# Patient Record
Sex: FEMALE | Race: ASIAN | NOT HISPANIC OR LATINO
[De-identification: names, ages, dates, MRNs, and addresses within clinical notes are randomized per-mention and may not be internally consistent; named-entity substitution may affect disease eponyms.]

---

## 2017-04-05 ENCOUNTER — MEDICATION RENEWAL (OUTPATIENT)
Age: 72
End: 2017-04-05

## 2017-11-02 ENCOUNTER — MEDICATION RENEWAL (OUTPATIENT)
Age: 72
End: 2017-11-02

## 2017-12-16 ENCOUNTER — NON-APPOINTMENT (OUTPATIENT)
Age: 72
End: 2017-12-16

## 2017-12-16 ENCOUNTER — APPOINTMENT (OUTPATIENT)
Dept: CARDIOLOGY | Facility: CLINIC | Age: 72
End: 2017-12-16
Payer: MEDICAID

## 2017-12-16 VITALS
DIASTOLIC BLOOD PRESSURE: 87 MMHG | RESPIRATION RATE: 17 BRPM | SYSTOLIC BLOOD PRESSURE: 145 MMHG | WEIGHT: 89 LBS | BODY MASS INDEX: 18.94 KG/M2 | OXYGEN SATURATION: 98 % | TEMPERATURE: 98.3 F | HEART RATE: 52 BPM

## 2017-12-16 DIAGNOSIS — Z23 ENCOUNTER FOR IMMUNIZATION: ICD-10-CM

## 2017-12-16 PROCEDURE — 90670 PCV13 VACCINE IM: CPT

## 2017-12-16 PROCEDURE — G0009: CPT

## 2017-12-16 PROCEDURE — G0008: CPT

## 2017-12-16 PROCEDURE — 99214 OFFICE O/P EST MOD 30 MIN: CPT | Mod: 25

## 2017-12-16 PROCEDURE — 90688 IIV4 VACCINE SPLT 0.5 ML IM: CPT

## 2017-12-16 PROCEDURE — 93000 ELECTROCARDIOGRAM COMPLETE: CPT

## 2017-12-18 LAB
25(OH)D3 SERPL-MCNC: 25.8 NG/ML
ALBUMIN SERPL ELPH-MCNC: 4.3 G/DL
ALP BLD-CCNC: 58 U/L
ALT SERPL-CCNC: 12 U/L
ANION GAP SERPL CALC-SCNC: 16 MMOL/L
AST SERPL-CCNC: 21 U/L
BASOPHILS # BLD AUTO: 0.03 K/UL
BASOPHILS NFR BLD AUTO: 0.5 %
BILIRUB SERPL-MCNC: 1.2 MG/DL
BUN SERPL-MCNC: 13 MG/DL
CALCIUM SERPL-MCNC: 9.5 MG/DL
CHLORIDE SERPL-SCNC: 104 MMOL/L
CHOLEST SERPL-MCNC: 138 MG/DL
CHOLEST/HDLC SERPL: 2.2 RATIO
CO2 SERPL-SCNC: 20 MMOL/L
CREAT SERPL-MCNC: 0.57 MG/DL
EOSINOPHIL # BLD AUTO: 0.25 K/UL
EOSINOPHIL NFR BLD AUTO: 4.3 %
GLUCOSE SERPL-MCNC: 89 MG/DL
HBA1C MFR BLD HPLC: 5.4 %
HCT VFR BLD CALC: 39.2 %
HDLC SERPL-MCNC: 63 MG/DL
HGB BLD-MCNC: 12.8 G/DL
IMM GRANULOCYTES NFR BLD AUTO: 0.2 %
LDLC SERPL CALC-MCNC: 62 MG/DL
LYMPHOCYTES # BLD AUTO: 1.23 K/UL
LYMPHOCYTES NFR BLD AUTO: 21.2 %
MAN DIFF?: NORMAL
MCHC RBC-ENTMCNC: 31.8 PG
MCHC RBC-ENTMCNC: 32.7 GM/DL
MCV RBC AUTO: 97.3 FL
MONOCYTES # BLD AUTO: 0.54 K/UL
MONOCYTES NFR BLD AUTO: 9.3 %
NEUTROPHILS # BLD AUTO: 3.73 K/UL
NEUTROPHILS NFR BLD AUTO: 64.5 %
PLATELET # BLD AUTO: 186 K/UL
POTASSIUM SERPL-SCNC: 4.4 MMOL/L
PROT SERPL-MCNC: 7.6 G/DL
RBC # BLD: 4.03 M/UL
RBC # FLD: 13.1 %
SODIUM SERPL-SCNC: 140 MMOL/L
TRIGL SERPL-MCNC: 65 MG/DL
TSH SERPL-ACNC: 0.86 UIU/ML
WBC # FLD AUTO: 5.79 K/UL

## 2018-01-03 ENCOUNTER — RESULT REVIEW (OUTPATIENT)
Age: 73
End: 2018-01-03

## 2018-01-08 ENCOUNTER — MEDICATION RENEWAL (OUTPATIENT)
Age: 73
End: 2018-01-08

## 2018-01-19 ENCOUNTER — MEDICATION RENEWAL (OUTPATIENT)
Age: 73
End: 2018-01-19

## 2018-04-16 ENCOUNTER — RX RENEWAL (OUTPATIENT)
Age: 73
End: 2018-04-16

## 2018-04-24 ENCOUNTER — RX RENEWAL (OUTPATIENT)
Age: 73
End: 2018-04-24

## 2018-07-24 ENCOUNTER — MEDICATION RENEWAL (OUTPATIENT)
Age: 73
End: 2018-07-24

## 2018-08-16 ENCOUNTER — RX RENEWAL (OUTPATIENT)
Age: 73
End: 2018-08-16

## 2018-09-08 ENCOUNTER — APPOINTMENT (OUTPATIENT)
Dept: CARDIOLOGY | Facility: CLINIC | Age: 73
End: 2018-09-08
Payer: MEDICAID

## 2018-09-08 VITALS
WEIGHT: 85 LBS | RESPIRATION RATE: 17 BRPM | OXYGEN SATURATION: 97 % | HEART RATE: 65 BPM | TEMPERATURE: 98.3 F | DIASTOLIC BLOOD PRESSURE: 72 MMHG | SYSTOLIC BLOOD PRESSURE: 146 MMHG | BODY MASS INDEX: 18.09 KG/M2

## 2018-09-08 PROCEDURE — 99214 OFFICE O/P EST MOD 30 MIN: CPT

## 2018-09-09 LAB
25(OH)D3 SERPL-MCNC: 40.8 NG/ML
ALBUMIN SERPL ELPH-MCNC: 4.9 G/DL
ALP BLD-CCNC: 48 U/L
ALT SERPL-CCNC: 8 U/L
ANION GAP SERPL CALC-SCNC: 16 MMOL/L
AST SERPL-CCNC: 24 U/L
BASOPHILS # BLD AUTO: 0.03 K/UL
BASOPHILS NFR BLD AUTO: 0.5 %
BILIRUB SERPL-MCNC: 1 MG/DL
BUN SERPL-MCNC: 11 MG/DL
CALCIUM SERPL-MCNC: 9.3 MG/DL
CHLORIDE SERPL-SCNC: 102 MMOL/L
CHOLEST SERPL-MCNC: 133 MG/DL
CHOLEST/HDLC SERPL: 2 RATIO
CO2 SERPL-SCNC: 21 MMOL/L
CREAT SERPL-MCNC: 0.6 MG/DL
EOSINOPHIL # BLD AUTO: 0.1 K/UL
EOSINOPHIL NFR BLD AUTO: 1.8 %
GLUCOSE SERPL-MCNC: 88 MG/DL
HBA1C MFR BLD HPLC: 5.4 %
HCT VFR BLD CALC: 39.8 %
HDLC SERPL-MCNC: 65 MG/DL
HGB BLD-MCNC: 12.7 G/DL
IMM GRANULOCYTES NFR BLD AUTO: 0.2 %
LDLC SERPL CALC-MCNC: 58 MG/DL
LYMPHOCYTES # BLD AUTO: 1.29 K/UL
LYMPHOCYTES NFR BLD AUTO: 22.8 %
MAN DIFF?: NORMAL
MCHC RBC-ENTMCNC: 31.3 PG
MCHC RBC-ENTMCNC: 31.9 GM/DL
MCV RBC AUTO: 98 FL
MONOCYTES # BLD AUTO: 0.43 K/UL
MONOCYTES NFR BLD AUTO: 7.6 %
NEUTROPHILS # BLD AUTO: 3.8 K/UL
NEUTROPHILS NFR BLD AUTO: 67.1 %
PLATELET # BLD AUTO: 183 K/UL
POTASSIUM SERPL-SCNC: 4.4 MMOL/L
PROT SERPL-MCNC: 7.6 G/DL
RBC # BLD: 4.06 M/UL
RBC # FLD: 13.1 %
SODIUM SERPL-SCNC: 139 MMOL/L
TRIGL SERPL-MCNC: 49 MG/DL
TSH SERPL-ACNC: 0.76 UIU/ML
WBC # FLD AUTO: 5.66 K/UL

## 2018-11-05 ENCOUNTER — RX RENEWAL (OUTPATIENT)
Age: 73
End: 2018-11-05

## 2018-11-05 LAB
H PYLORI AB SER-ACNC: 9.5 UNITS
H PYLORI IGA SER-ACNC: 29.9 UNITS

## 2018-12-20 ENCOUNTER — APPOINTMENT (OUTPATIENT)
Dept: GASTROENTEROLOGY | Facility: CLINIC | Age: 73
End: 2018-12-20

## 2018-12-26 ENCOUNTER — RX RENEWAL (OUTPATIENT)
Age: 73
End: 2018-12-26

## 2019-03-01 ENCOUNTER — MEDICATION RENEWAL (OUTPATIENT)
Age: 74
End: 2019-03-01

## 2019-09-19 ENCOUNTER — MEDICATION RENEWAL (OUTPATIENT)
Age: 74
End: 2019-09-19

## 2019-10-30 RX ORDER — OMEPRAZOLE 20 MG/1
20 CAPSULE, DELAYED RELEASE ORAL DAILY
Qty: 90 | Refills: 1 | Status: DISCONTINUED | COMMUNITY
Start: 2018-09-08 | End: 2019-10-30

## 2019-10-31 ENCOUNTER — APPOINTMENT (OUTPATIENT)
Dept: CARDIOLOGY | Facility: CLINIC | Age: 74
End: 2019-10-31
Payer: MEDICAID

## 2019-10-31 ENCOUNTER — NON-APPOINTMENT (OUTPATIENT)
Age: 74
End: 2019-10-31

## 2019-10-31 VITALS
RESPIRATION RATE: 17 BRPM | OXYGEN SATURATION: 100 % | HEART RATE: 56 BPM | WEIGHT: 79 LBS | DIASTOLIC BLOOD PRESSURE: 67 MMHG | BODY MASS INDEX: 16.81 KG/M2 | TEMPERATURE: 98.2 F | SYSTOLIC BLOOD PRESSURE: 133 MMHG

## 2019-10-31 DIAGNOSIS — M81.0 AGE-RELATED OSTEOPOROSIS W/OUT CURRENT PATHOLOGICAL FRACTURE: ICD-10-CM

## 2019-10-31 PROCEDURE — 93000 ELECTROCARDIOGRAM COMPLETE: CPT

## 2019-10-31 PROCEDURE — 99214 OFFICE O/P EST MOD 30 MIN: CPT

## 2019-11-01 NOTE — HISTORY OF PRESENT ILLNESS
[FreeTextEntry1] : Patient denies CP, SOB, palpitations.  She reports having stomach discomfort that often feels like hunger associated with diaphoresis. Patient reports dizziness when she wakes up. She feels better later in the day. Patient had flu shot and she reports no more migraines. She still has symptoms of bile gastritis and needs a refill if Carafate. \par \par 73-year-old female with vertebral artery stenosis, osteoporosis, HTN and HLD, presents for followup.  Patient was last seen on 9/8/18 for poor appetite, weight loss, and HA .  She underwent an EGD and it showed severe bile gastritis.  She was advised to take Carafate daily.  She underwent a colonoscopy and it showed a colonic polyp.  She was advised to undergo a brain MRI and start Nortriptyline 25 mg at bedtime.  ASA 81 mg and Simvastatin 20 mg for vertebral artery stenosis.  She is on Amlodipine 5 mg for HTN.\par \par (1) Poor appetite, weight loss - I advised patient to see GI.  She underwent an EGD and it showed severe bile gastritis.  She was advised to take Carafate daily.  She underwent a colonoscopy and it showed a colonic polyp.  \par \par (2) HA - I advised patient to see Neurology.  She was advised to undergo a brain MRI and start Nortriptyline 25 mg at bedtime.\par \par (3) HTN - Her BP was borderline.  She should continue Amlodipine 5 mg. \par \par (4) Vertebral artery stenosis - Patient has been stable.  I advised her to continue  ASA 81 mg and Simvastatin 20 mg.  \par \par (5) HCM - 2016 Mammogram negative, 2016 Dexa -4.5,  EGD 2019, colonoscopy 2019.\par \par (6) Followup - 1 year.\par \par 12/16/17.  Patient reports poor appetite and poor sleep when the weather is hot.  Patient reports weight loss and weakness.  Patient denies nausea or vomiting.  She has never had EGD.  Patient reports occasional CP not related to exertion.  Patient denies SOB.  Patient denies palpitations.  Patient reports left-sided HA.  She is on ASA 81 mg and Simvastatin 20 mg for vertebral artery stenosis.  She is on Amlodipine 5 mg for HTN.

## 2019-11-01 NOTE — DISCUSSION/SUMMARY
[FreeTextEntry1] : 73-year-old female with vertebral artery stenosis, HTN and HLD, presents for followup.  Patient was last seen on 12/16/17.  Patient reports poor appetite and poor sleep when the weather is hot.  Patient reports weight loss and weakness.  Patient denies nausea or vomiting.  She has never had EGD.  Patient reports occasional CP not related to exertion.  Patient denies SOB.  Patient denies palpitations.  Patient reports left-sided HA.  She is on ASA 81 mg and Simvastatin 20 mg for vertebral artery stenosis.  She is on Amlodipine 5 mg for HTN.  \par \par (1) Poor appetite, weight loss - I advised patient to see GI.  She underwent an EGD and it showed severe bile gastritis.  She was advised to take Carafate daily.  She underwent a colonoscopy and it showed a colonic polyp.  \par \par (2) HA - I advised patient to see Neurology.  She was advised to undergo a brain MRI and start Nortriptyline 25 mg at bedtime.\par \par (3) HTN - Her BP was borderline.  She should continue Amlodipine 5 mg. \par \par (4) Vertebral artery stenosis - Patient has been stable.  I advised her to continue  ASA 81 mg and Simvastatin 20 mg.  \par \par (5) HCM - 2016 Mammogram negative, 2016 Dexa -4.5,  EGD 2019, colonoscopy 2019.\par \par (6) Followup - 1 year.

## 2019-11-01 NOTE — PHYSICAL EXAM
[General Appearance - Well Developed] : well developed [Normal Appearance] : normal appearance [Well Groomed] : well groomed [General Appearance - Well Nourished] : well nourished [No Deformities] : no deformities [General Appearance - In No Acute Distress] : no acute distress [Normal Conjunctiva] : the conjunctiva exhibited no abnormalities [Eyelids - No Xanthelasma] : the eyelids demonstrated no xanthelasmas [Normal Oral Mucosa] : normal oral mucosa [No Oral Pallor] : no oral pallor [No Oral Cyanosis] : no oral cyanosis [Normal Jugular Venous A Waves Present] : normal jugular venous A waves present [Normal Jugular Venous V Waves Present] : normal jugular venous V waves present [No Jugular Venous Marte A Waves] : no jugular venous marte A waves [Respiration, Rhythm And Depth] : normal respiratory rhythm and effort [Exaggerated Use Of Accessory Muscles For Inspiration] : no accessory muscle use [Auscultation Breath Sounds / Voice Sounds] : lungs were clear to auscultation bilaterally [Heart Rate And Rhythm] : heart rate and rhythm were normal [Heart Sounds] : normal S1 and S2 [Murmurs] : no murmurs present [Arterial Pulses Normal] : the arterial pulses were normal [Edema] : no peripheral edema present [Abdomen Soft] : soft [Abdomen Tenderness] : non-tender [Abdomen Mass (___ Cm)] : no abdominal mass palpated [Abnormal Walk] : normal gait [Gait - Sufficient For Exercise Testing] : the gait was sufficient for exercise testing [Nail Clubbing] : no clubbing of the fingernails [Cyanosis, Localized] : no localized cyanosis [Petechial Hemorrhages (___cm)] : no petechial hemorrhages [] : no ischemic changes [Oriented To Time, Place, And Person] : oriented to person, place, and time [Affect] : the affect was normal [Mood] : the mood was normal [No Anxiety] : not feeling anxious [FreeTextEntry1] : Left carotid bruit noted.

## 2019-12-06 ENCOUNTER — MEDICATION RENEWAL (OUTPATIENT)
Age: 74
End: 2019-12-06

## 2019-12-19 ENCOUNTER — MEDICATION RENEWAL (OUTPATIENT)
Age: 74
End: 2019-12-19

## 2020-02-04 ENCOUNTER — RX RENEWAL (OUTPATIENT)
Age: 75
End: 2020-02-04

## 2020-09-25 ENCOUNTER — APPOINTMENT (OUTPATIENT)
Dept: CARDIOLOGY | Facility: CLINIC | Age: 75
End: 2020-09-25
Payer: MEDICAID

## 2020-09-25 ENCOUNTER — NON-APPOINTMENT (OUTPATIENT)
Age: 75
End: 2020-09-25

## 2020-09-25 VITALS
TEMPERATURE: 98.4 F | RESPIRATION RATE: 17 BRPM | OXYGEN SATURATION: 100 % | SYSTOLIC BLOOD PRESSURE: 139 MMHG | BODY MASS INDEX: 16.6 KG/M2 | WEIGHT: 78 LBS | DIASTOLIC BLOOD PRESSURE: 67 MMHG | HEART RATE: 63 BPM

## 2020-09-25 DIAGNOSIS — M25.579 PAIN IN UNSPECIFIED ANKLE AND JOINTS OF UNSPECIFIED FOOT: ICD-10-CM

## 2020-09-25 PROCEDURE — 93000 ELECTROCARDIOGRAM COMPLETE: CPT

## 2020-09-25 PROCEDURE — 90662 IIV NO PRSV INCREASED AG IM: CPT

## 2020-09-25 PROCEDURE — 99214 OFFICE O/P EST MOD 30 MIN: CPT | Mod: 25

## 2020-09-25 PROCEDURE — G0008: CPT

## 2020-09-28 LAB
25(OH)D3 SERPL-MCNC: 36.9 NG/ML
ALBUMIN SERPL ELPH-MCNC: 4.5 G/DL
ALP BLD-CCNC: 66 U/L
ALT SERPL-CCNC: 11 U/L
ANION GAP SERPL CALC-SCNC: 12 MMOL/L
AST SERPL-CCNC: 20 U/L
BASOPHILS # BLD AUTO: 0.02 K/UL
BASOPHILS NFR BLD AUTO: 0.5 %
BILIRUB SERPL-MCNC: 0.8 MG/DL
BUN SERPL-MCNC: 10 MG/DL
CALCIUM SERPL-MCNC: 9.1 MG/DL
CHLORIDE SERPL-SCNC: 106 MMOL/L
CHOLEST SERPL-MCNC: 135 MG/DL
CHOLEST/HDLC SERPL: 1.9 RATIO
CO2 SERPL-SCNC: 23 MMOL/L
CREAT SERPL-MCNC: 0.55 MG/DL
EOSINOPHIL # BLD AUTO: 0.04 K/UL
EOSINOPHIL NFR BLD AUTO: 0.9 %
ESTIMATED AVERAGE GLUCOSE: 108 MG/DL
GLUCOSE SERPL-MCNC: 94 MG/DL
HBA1C MFR BLD HPLC: 5.4 %
HCT VFR BLD CALC: 36.1 %
HDLC SERPL-MCNC: 69 MG/DL
HGB BLD-MCNC: 11.5 G/DL
IMM GRANULOCYTES NFR BLD AUTO: 0.5 %
LDLC SERPL CALC-MCNC: 52 MG/DL
LYMPHOCYTES # BLD AUTO: 0.94 K/UL
LYMPHOCYTES NFR BLD AUTO: 22.3 %
MAN DIFF?: NORMAL
MCHC RBC-ENTMCNC: 31.9 GM/DL
MCHC RBC-ENTMCNC: 32.4 PG
MCV RBC AUTO: 101.7 FL
MONOCYTES # BLD AUTO: 0.32 K/UL
MONOCYTES NFR BLD AUTO: 7.6 %
NEUTROPHILS # BLD AUTO: 2.88 K/UL
NEUTROPHILS NFR BLD AUTO: 68.2 %
PLATELET # BLD AUTO: 179 K/UL
POTASSIUM SERPL-SCNC: 4.6 MMOL/L
PROT SERPL-MCNC: 6.7 G/DL
RBC # BLD: 3.55 M/UL
RBC # FLD: 12.5 %
SODIUM SERPL-SCNC: 142 MMOL/L
TRIGL SERPL-MCNC: 67 MG/DL
TSH SERPL-ACNC: 0.69 UIU/ML
WBC # FLD AUTO: 4.22 K/UL

## 2020-10-01 NOTE — HISTORY OF PRESENT ILLNESS
[FreeTextEntry1] : \par \par 73-year-old female with vertebral artery stenosis, osteoporosis, HTN and HLD, presents for followup.  Patient was last seen on 9/8/18 for poor appetite, weight loss, and HA .  She underwent an EGD and it showed severe bile gastritis.  She was advised to take Carafate daily.  She underwent a colonoscopy and it showed a colonic polyp.  She was advised to undergo a brain MRI and start Nortriptyline 25 mg at bedtime.  ASA 81 mg and Simvastatin 20 mg for vertebral artery stenosis.  She is on Amlodipine 5 mg for HTN.\par \par (1) Poor appetite, weight loss - I advised patient to see GI.  She underwent an EGD and it showed severe bile gastritis.  She was advised to take Carafate daily.  She underwent a colonoscopy and it showed a colonic polyp.  \par \par (2) HA - I advised patient to see Neurology.  She was advised to undergo a brain MRI and start Nortriptyline 25 mg at bedtime.\par \par (3) HTN - Her BP was borderline.  She should continue Amlodipine 5 mg. \par \par (4) Vertebral artery stenosis - Patient has been stable.  I advised her to continue  ASA 81 mg and Simvastatin 20 mg.  \par \par (5) HCM - 2016 Mammogram negative, 2016 Dexa -4.5,  EGD 2019, colonoscopy 2019.\par \par (6) Followup - 1 year.\par \par 12/16/17.  Patient reports poor appetite and poor sleep when the weather is hot.  Patient reports weight loss and weakness.  Patient denies nausea or vomiting.  She has never had EGD.  Patient reports occasional CP not related to exertion.  Patient denies SOB.  Patient denies palpitations.  Patient reports left-sided HA.  She is on ASA 81 mg and Simvastatin 20 mg for vertebral artery stenosis.  She is on Amlodipine 5 mg for HTN.

## 2020-10-01 NOTE — REASON FOR VISIT
[Follow-Up - Clinic] : a clinic follow-up of [Hypertension] : hypertension [FreeTextEntry1] : 9/25/20 - Patient reports that she has ankle pain and is unable to walk. Patient was taking pain killers and also has stomach pain and gum inflammation recently and is unable to eat. She was supposed to undergo tooth extraction but was unable to proceed reason unclear. Patient is advised to mobilize more. I advised patient to switch to Celebrex which is better for her stomach and should resume Alendronate. Patient had lab work and flu shot today. \par \par 10/31/19 - Patient denies CP, SOB, palpitations.  She reports having stomach discomfort that often feels like hunger associated with diaphoresis. Patient reports dizziness when she wakes up. She feels better later in the day. Patient had flu shot and she reports no more migraines. She still has symptoms of bile gastritis and needs a refill if Carafate. \par

## 2020-12-03 DIAGNOSIS — R42 DIZZINESS AND GIDDINESS: ICD-10-CM

## 2020-12-14 ENCOUNTER — APPOINTMENT (OUTPATIENT)
Dept: CARDIOLOGY | Facility: CLINIC | Age: 75
End: 2020-12-14
Payer: MEDICAID

## 2020-12-14 PROCEDURE — 99441: CPT

## 2020-12-18 ENCOUNTER — APPOINTMENT (OUTPATIENT)
Dept: CARDIOLOGY | Facility: CLINIC | Age: 75
End: 2020-12-18

## 2020-12-21 RX ORDER — OMEPRAZOLE 20 MG/1
20 CAPSULE, DELAYED RELEASE ORAL
Qty: 30 | Refills: 5 | Status: ACTIVE | COMMUNITY
Start: 2020-12-14 | End: 1900-01-01

## 2020-12-30 NOTE — HISTORY OF PRESENT ILLNESS
[FreeTextEntry1] : 73-year-old female with vertebral artery stenosis, osteoporosis, HTN and HLD, presents for followup.  \par \par Patient was last seen on 9/25/20.  She is on ASA 81 mg and Simvastatin 20 mg for vertebral artery stenosis.  She is on Amlodipine 5 mg for HTN.\par \par ------------------------------------------------------------------------------------------------------------- \par previous visit summary:\par \par (1) Poor appetite, weight loss - I advised patient to see GI.  She underwent an EGD and it showed severe bile gastritis.  She was advised to take Carafate daily.  She underwent a colonoscopy and it showed a colonic polyp.  \par \par (2) HA - I advised patient to see Neurology.  She was advised to undergo a brain MRI and start Nortriptyline 25 mg at bedtime.\par \par (3) HTN - Her BP was borderline.  She should continue Amlodipine 5 mg. \par \par (4) Vertebral artery stenosis - Patient has been stable.  I advised her to continue  ASA 81 mg and Simvastatin 20 mg.  \par \par (5) HCM - 2016 Mammogram negative, 2016 Dexa -4.5,  EGD 2019, colonoscopy 2019.\par \par (6) Followup - 1 year.\par \par 12/16/17.  Patient reports poor appetite and poor sleep when the weather is hot.  Patient reports weight loss and weakness.  Patient denies nausea or vomiting.  She has never had EGD.  Patient reports occasional CP not related to exertion.  Patient denies SOB.  Patient denies palpitations.  Patient reports left-sided HA.  She is on ASA 81 mg and Simvastatin 20 mg for vertebral artery stenosis.  She is on Amlodipine 5 mg for HTN.

## 2020-12-30 NOTE — REASON FOR VISIT
[Follow-Up - Clinic] : a clinic follow-up of [Hypertension] : hypertension [FreeTextEntry1] : 12/14/20 -\par \par \par \par 9/25/20 - Patient reports that she has ankle pain and is unable to walk. Patient was taking pain killers and also has stomach pain and gum inflammation recently and is unable to eat. She was supposed to undergo tooth extraction but was unable to proceed reason unclear. Patient is advised to mobilize more. I advised patient to switch to Celebrex which is better for her stomach and should resume Alendronate. Patient had lab work and flu shot today. \par \par 10/31/19 - Patient denies CP, SOB, palpitations.  She reports having stomach discomfort that often feels like hunger associated with diaphoresis. Patient reports dizziness when she wakes up. She feels better later in the day. Patient had flu shot and she reports no more migraines. She still has symptoms of bile gastritis and needs a refill if Carafate. \par

## 2021-10-07 ENCOUNTER — NON-APPOINTMENT (OUTPATIENT)
Age: 76
End: 2021-10-07

## 2021-10-07 ENCOUNTER — APPOINTMENT (OUTPATIENT)
Dept: CARDIOLOGY | Facility: CLINIC | Age: 76
End: 2021-10-07
Payer: MEDICAID

## 2021-10-07 VITALS
WEIGHT: 76 LBS | HEART RATE: 63 BPM | BODY MASS INDEX: 16.17 KG/M2 | RESPIRATION RATE: 18 BRPM | TEMPERATURE: 97.8 F | DIASTOLIC BLOOD PRESSURE: 76 MMHG | OXYGEN SATURATION: 99 % | SYSTOLIC BLOOD PRESSURE: 181 MMHG

## 2021-10-07 DIAGNOSIS — I65.02 OCCLUSION AND STENOSIS OF LEFT VERTEBRAL ARTERY: ICD-10-CM

## 2021-10-07 PROCEDURE — 99214 OFFICE O/P EST MOD 30 MIN: CPT | Mod: 25

## 2021-10-07 PROCEDURE — 90662 IIV NO PRSV INCREASED AG IM: CPT

## 2021-10-07 PROCEDURE — 93000 ELECTROCARDIOGRAM COMPLETE: CPT

## 2021-10-07 PROCEDURE — 90471 IMMUNIZATION ADMIN: CPT

## 2021-10-07 RX ORDER — SUCRALFATE 1 G/1
1 TABLET ORAL
Qty: 90 | Refills: 3 | Status: ACTIVE | COMMUNITY
Start: 2021-10-07 | End: 1900-01-01

## 2021-10-08 LAB
25(OH)D3 SERPL-MCNC: 34.9 NG/ML
ALBUMIN SERPL ELPH-MCNC: 5 G/DL
ALP BLD-CCNC: 50 U/L
ALT SERPL-CCNC: 13 U/L
ANION GAP SERPL CALC-SCNC: 12 MMOL/L
AST SERPL-CCNC: 22 U/L
BASOPHILS # BLD AUTO: 0.03 K/UL
BASOPHILS NFR BLD AUTO: 0.7 %
BILIRUB SERPL-MCNC: 1.1 MG/DL
BUN SERPL-MCNC: 13 MG/DL
CALCIUM SERPL-MCNC: 9.2 MG/DL
CHLORIDE SERPL-SCNC: 106 MMOL/L
CHOLEST SERPL-MCNC: 143 MG/DL
CO2 SERPL-SCNC: 24 MMOL/L
CREAT SERPL-MCNC: 0.63 MG/DL
EOSINOPHIL # BLD AUTO: 0.07 K/UL
EOSINOPHIL NFR BLD AUTO: 1.7 %
ESTIMATED AVERAGE GLUCOSE: 105 MG/DL
GLUCOSE SERPL-MCNC: 96 MG/DL
HBA1C MFR BLD HPLC: 5.3 %
HCT VFR BLD CALC: 38.5 %
HDLC SERPL-MCNC: 71 MG/DL
HGB BLD-MCNC: 12.3 G/DL
IMM GRANULOCYTES NFR BLD AUTO: 0.2 %
LDLC SERPL CALC-MCNC: 57 MG/DL
LYMPHOCYTES # BLD AUTO: 1.25 K/UL
LYMPHOCYTES NFR BLD AUTO: 30.6 %
MAN DIFF?: NORMAL
MCHC RBC-ENTMCNC: 31.6 PG
MCHC RBC-ENTMCNC: 31.9 GM/DL
MCV RBC AUTO: 99 FL
MONOCYTES # BLD AUTO: 0.39 K/UL
MONOCYTES NFR BLD AUTO: 9.5 %
NEUTROPHILS # BLD AUTO: 2.34 K/UL
NEUTROPHILS NFR BLD AUTO: 57.3 %
NONHDLC SERPL-MCNC: 72 MG/DL
PLATELET # BLD AUTO: 165 K/UL
POTASSIUM SERPL-SCNC: 4.3 MMOL/L
PROT SERPL-MCNC: 7 G/DL
RBC # BLD: 3.89 M/UL
RBC # FLD: 12.9 %
SODIUM SERPL-SCNC: 142 MMOL/L
TRIGL SERPL-MCNC: 73 MG/DL
TSH SERPL-ACNC: 0.61 UIU/ML
WBC # FLD AUTO: 4.09 K/UL

## 2021-10-11 NOTE — HISTORY OF PRESENT ILLNESS
[FreeTextEntry1] : 76-year-old female with vertebral artery stenosis, osteoporosis, HTN and HLD, presents for followup.  \par \par Patient was last seen on 9/25/20.  \par \par She is on ASA 81 mg and Simvastatin 20 mg for vertebral artery stenosis.  She is on Amlodipine 5 mg for HTN.

## 2021-10-11 NOTE — REASON FOR VISIT
[Symptom and Test Evaluation] : symptom and test evaluation [FreeTextEntry1] : 10/7/21 - Patient reports poor appetite and epigastric discomfort at night. Her BP is elevated today. Patient reports that she does not check BP at home and did not take her medications today. Patient had stopped Alendronate for 1/2 year due to tooth extraction and resumed due to bilateral leg pain.  Patient denies CP. Patient denies palpitations. ECG was done for hypertension. \par \par 12/14/20 phone visit -\par  \par \par \par 9/25/20 - Patient reports that she has ankle pain and is unable to walk. Patient was taking pain killers and also has stomach pain and gum inflammation recently and is unable to eat. She was supposed to undergo tooth extraction but was unable to proceed reason unclear. Patient is advised to mobilize more. I advised patient to switch to Celebrex which is better for her stomach and should resume Alendronate. Patient had lab work and flu shot today. \par \par 10/31/19 - Patient denies CP, SOB, palpitations.  She reports having stomach discomfort that often feels like hunger associated with diaphoresis. Patient reports dizziness when she wakes up. She feels better later in the day. Patient had flu shot and she reports no more migraines. She still has symptoms of bile gastritis and needs a refill if Carafate. \par

## 2021-10-20 RX ORDER — AMOXICILLIN 500 MG/1
500 CAPSULE ORAL
Qty: 30 | Refills: 0 | Status: ACTIVE | COMMUNITY
Start: 2020-09-25 | End: 1900-01-01

## 2021-10-23 RX ORDER — CELECOXIB 200 MG/1
200 CAPSULE ORAL DAILY
Qty: 30 | Refills: 1 | Status: DISCONTINUED | COMMUNITY
Start: 2020-09-25 | End: 2021-10-23

## 2021-10-23 RX ORDER — SUCRALFATE 1 G/10ML
1 SUSPENSION ORAL
Qty: 2 | Refills: 5 | Status: DISCONTINUED | COMMUNITY
Start: 2019-10-31 | End: 2021-10-23

## 2022-06-24 ENCOUNTER — NON-APPOINTMENT (OUTPATIENT)
Age: 77
End: 2022-06-24

## 2022-06-24 ENCOUNTER — APPOINTMENT (OUTPATIENT)
Dept: CARDIOLOGY | Facility: CLINIC | Age: 77
End: 2022-06-24
Payer: MEDICAID

## 2022-06-24 ENCOUNTER — LABORATORY RESULT (OUTPATIENT)
Age: 77
End: 2022-06-24

## 2022-06-24 VITALS
SYSTOLIC BLOOD PRESSURE: 137 MMHG | TEMPERATURE: 97.6 F | HEART RATE: 65 BPM | BODY MASS INDEX: 14.9 KG/M2 | OXYGEN SATURATION: 99 % | DIASTOLIC BLOOD PRESSURE: 78 MMHG | WEIGHT: 70 LBS | RESPIRATION RATE: 18 BRPM

## 2022-06-24 PROCEDURE — 93000 ELECTROCARDIOGRAM COMPLETE: CPT

## 2022-06-24 PROCEDURE — 99214 OFFICE O/P EST MOD 30 MIN: CPT

## 2022-06-28 LAB
25(OH)D3 SERPL-MCNC: 35.2 NG/ML
ALBUMIN SERPL ELPH-MCNC: 4.7 G/DL
ALP BLD-CCNC: 48 U/L
ALT SERPL-CCNC: 9 U/L
ANION GAP SERPL CALC-SCNC: 12 MMOL/L
AST SERPL-CCNC: 19 U/L
BASOPHILS # BLD AUTO: 0.01 K/UL
BASOPHILS NFR BLD AUTO: 0.4 %
BILIRUB SERPL-MCNC: 0.7 MG/DL
BUN SERPL-MCNC: 10 MG/DL
CALCIUM SERPL-MCNC: 9.2 MG/DL
CHLORIDE SERPL-SCNC: 105 MMOL/L
CHOLEST SERPL-MCNC: 157 MG/DL
CO2 SERPL-SCNC: 25 MMOL/L
CREAT SERPL-MCNC: 0.6 MG/DL
EGFR: 91 ML/MIN/1.73M2
EOSINOPHIL # BLD AUTO: 0.04 K/UL
EOSINOPHIL NFR BLD AUTO: 1.7 %
ESTIMATED AVERAGE GLUCOSE: 111 MG/DL
GLUCOSE SERPL-MCNC: 91 MG/DL
HBA1C MFR BLD HPLC: 5.5 %
HCT VFR BLD CALC: 37.9 %
HDLC SERPL-MCNC: 66 MG/DL
HGB BLD-MCNC: 12.3 G/DL
IMM GRANULOCYTES NFR BLD AUTO: 0.4 %
LDLC SERPL CALC-MCNC: 78 MG/DL
LYMPHOCYTES # BLD AUTO: 0.8 K/UL
LYMPHOCYTES NFR BLD AUTO: 34.6 %
MAN DIFF?: NORMAL
MCHC RBC-ENTMCNC: 32.5 GM/DL
MCHC RBC-ENTMCNC: 32.6 PG
MCV RBC AUTO: 100.5 FL
MONOCYTES # BLD AUTO: 0.26 K/UL
MONOCYTES NFR BLD AUTO: 11.3 %
NEUTROPHILS # BLD AUTO: 1.19 K/UL
NEUTROPHILS NFR BLD AUTO: 51.6 %
NONHDLC SERPL-MCNC: 91 MG/DL
PLATELET # BLD AUTO: 154 K/UL
POTASSIUM SERPL-SCNC: 4.5 MMOL/L
PROT SERPL-MCNC: 6.9 G/DL
RBC # BLD: 3.77 M/UL
RBC # FLD: 13.1 %
SODIUM SERPL-SCNC: 141 MMOL/L
TRIGL SERPL-MCNC: 62 MG/DL
TSH SERPL-ACNC: 0.83 UIU/ML
WBC # FLD AUTO: 2.31 K/UL

## 2022-09-09 ENCOUNTER — APPOINTMENT (OUTPATIENT)
Dept: CARDIOLOGY | Facility: CLINIC | Age: 77
End: 2022-09-09

## 2022-09-09 DIAGNOSIS — R10.13 EPIGASTRIC PAIN: ICD-10-CM

## 2022-09-09 DIAGNOSIS — K29.70 GASTRITIS, UNSPECIFIED, W/OUT BLEEDING: ICD-10-CM

## 2022-09-09 PROCEDURE — 99442: CPT

## 2022-09-09 NOTE — HISTORY OF PRESENT ILLNESS
[FreeTextEntry1] : \par 6/24/22 - Patient reports stomach fullness and poor appetite.  Patient reports weight loss.  Patient has h/o bile gastritis by EGD 2019.  Repeat EGD in 2 years recommended.  I advised patient to see GI.  I advised patient to supplement with Ensure.  Patient denies CP, SOB, or palpitations.  Patient denies dizziness.  I will check BT.  I advised patient to continue current medications. \par \par 10/7/21 - Patient reports poor appetite and epigastric discomfort at night. Her BP is elevated today. Patient reports that she does not check BP at home and did not take her medications today. Patient had stopped Alendronate for 1/2 year due to tooth extraction and resumed due to bilateral leg pain.  Patient denies CP. Patient denies palpitations. ECG was done for hypertension. \par \par 12/14/20 phone visit -\par  \par 9/25/20 - Patient reports that she has ankle pain and is unable to walk. Patient was taking pain killers and also has stomach pain and gum inflammation recently and is unable to eat. She was supposed to undergo tooth extraction but was unable to proceed reason unclear. Patient is advised to mobilize more. I advised patient to switch to Celebrex which is better for her stomach and should resume Alendronate. Patient had lab work and flu shot today. \par \par 10/31/19 - Patient denies CP, SOB, palpitations.  She reports having stomach discomfort that often feels like hunger associated with diaphoresis. Patient reports dizziness when she wakes up. She feels better later in the day. Patient had flu shot and she reports no more migraines. She still has symptoms of bile gastritis and needs a refill if Carafate. \par

## 2022-09-09 NOTE — REASON FOR VISIT
[FreeTextEntry1] : 76-year-old female with vertebral artery stenosis, osteoporosis, HTN and HLD, presents for followup.  \par \par Patient was last seen on 10/7/21 for poor appetite and epigastric discomfort at night.  I advised patient to try Carafate at bedtime.\par \par She is on ASA 81 mg and Simvastatin 20 mg for vertebral artery stenosis.  She is on Amlodipine 5 mg for HTN.\par \par

## 2023-02-22 PROBLEM — K29.60 GASTRITIS, BILE ACID REFLUX: Status: ACTIVE | Noted: 2019-10-31

## 2023-02-22 PROBLEM — R53.83 FATIGUE: Status: ACTIVE | Noted: 2018-09-08

## 2023-02-23 ENCOUNTER — APPOINTMENT (OUTPATIENT)
Dept: CARDIOLOGY | Facility: CLINIC | Age: 78
End: 2023-02-23
Payer: MEDICAID

## 2023-02-23 ENCOUNTER — NON-APPOINTMENT (OUTPATIENT)
Age: 78
End: 2023-02-23

## 2023-02-23 VITALS
RESPIRATION RATE: 18 BRPM | BODY MASS INDEX: 15.32 KG/M2 | OXYGEN SATURATION: 97 % | SYSTOLIC BLOOD PRESSURE: 158 MMHG | WEIGHT: 72 LBS | DIASTOLIC BLOOD PRESSURE: 73 MMHG | TEMPERATURE: 98 F | HEART RATE: 60 BPM

## 2023-02-23 DIAGNOSIS — M54.9 DORSALGIA, UNSPECIFIED: ICD-10-CM

## 2023-02-23 DIAGNOSIS — E55.9 VITAMIN D DEFICIENCY, UNSPECIFIED: ICD-10-CM

## 2023-02-23 PROCEDURE — 99214 OFFICE O/P EST MOD 30 MIN: CPT

## 2023-02-23 PROCEDURE — 93000 ELECTROCARDIOGRAM COMPLETE: CPT

## 2023-02-23 NOTE — REASON FOR VISIT
[FreeTextEntry1] : 77 year-old female with vertebral artery stenosis, osteoporosis, HTN and HLD, presents for followup.  \par \par Patient was last seen on 6/24/22 for followup.  Patient reported stomach fullness and poor appetite.  Patient reported weight loss.  Patient had h/o bile gastritis by EGD 2019.  Repeat EGD in 2 years recommended.  I advised patient to see GI.  I advised patient to supplement with Ensure.   \par \par She is on ASA 81 mg and Simvastatin 20 mg for vertebral artery stenosis.  She is on Amlodipine 5 mg for HTN.\par \par

## 2023-02-23 NOTE — HISTORY OF PRESENT ILLNESS
[FreeTextEntry1] : 2/23/23 - Patient reports back pain. Patient denies CP, SOB, palpitations, or lightheadedness. I advised patient to get blood test. \par \par \par 6/24/22 - Patient reports stomach fullness and poor appetite.  Patient reports weight loss.  Patient has h/o bile gastritis by EGD 2019.  Repeat EGD in 2 years recommended.  I advised patient to see GI.  I advised patient to supplement with Ensure.  Patient denies CP, SOB, or palpitations.  Patient denies dizziness.  I will check BT.  I advised patient to continue current medications. \par \par 10/7/21 - Patient reports poor appetite and epigastric discomfort at night. Her BP is elevated today. Patient reports that she does not check BP at home and did not take her medications today. Patient had stopped Alendronate for 1/2 year due to tooth extraction and resumed due to bilateral leg pain.  Patient denies CP. Patient denies palpitations. ECG was done for hypertension. \par \par 12/14/20 phone visit -\par  \par 9/25/20 - Patient reports that she has ankle pain and is unable to walk. Patient was taking pain killers and also has stomach pain and gum inflammation recently and is unable to eat. She was supposed to undergo tooth extraction but was unable to proceed reason unclear. Patient is advised to mobilize more. I advised patient to switch to Celebrex which is better for her stomach and should resume Alendronate. Patient had lab work and flu shot today. \par \par 10/31/19 - Patient denies CP, SOB, palpitations.  She reports having stomach discomfort that often feels like hunger associated with diaphoresis. Patient reports dizziness when she wakes up. She feels better later in the day. Patient had flu shot and she reports no more migraines. She still has symptoms of bile gastritis and needs a refill if Carafate. \par

## 2023-02-24 ENCOUNTER — APPOINTMENT (OUTPATIENT)
Dept: CARDIOLOGY | Facility: CLINIC | Age: 78
End: 2023-02-24
Payer: MEDICAID

## 2023-02-24 DIAGNOSIS — K29.60 OTHER GASTRITIS W/OUT BLEEDING: ICD-10-CM

## 2023-02-24 DIAGNOSIS — R53.83 OTHER FATIGUE: ICD-10-CM

## 2023-02-24 LAB
25(OH)D3 SERPL-MCNC: 41.3 NG/ML
ALBUMIN SERPL ELPH-MCNC: 4.9 G/DL
ALP BLD-CCNC: 52 U/L
ALT SERPL-CCNC: 11 U/L
ANION GAP SERPL CALC-SCNC: 11 MMOL/L
APPEARANCE: CLEAR
AST SERPL-CCNC: 20 U/L
BASOPHILS # BLD AUTO: 0.03 K/UL
BASOPHILS NFR BLD AUTO: 0.8 %
BILIRUB SERPL-MCNC: 1.3 MG/DL
BILIRUBIN URINE: NEGATIVE
BLOOD URINE: NEGATIVE
BUN SERPL-MCNC: 11 MG/DL
CALCIUM SERPL-MCNC: 9.5 MG/DL
CHLORIDE SERPL-SCNC: 105 MMOL/L
CHOLEST SERPL-MCNC: 148 MG/DL
CO2 SERPL-SCNC: 25 MMOL/L
COLOR: NORMAL
CREAT SERPL-MCNC: 0.61 MG/DL
EGFR: 91 ML/MIN/1.73M2
EOSINOPHIL # BLD AUTO: 0.12 K/UL
EOSINOPHIL NFR BLD AUTO: 3.2 %
ESTIMATED AVERAGE GLUCOSE: 108 MG/DL
GLUCOSE QUALITATIVE U: NEGATIVE
GLUCOSE SERPL-MCNC: 93 MG/DL
HBA1C MFR BLD HPLC: 5.4 %
HCT VFR BLD CALC: 37.8 %
HDLC SERPL-MCNC: 79 MG/DL
HGB BLD-MCNC: 12.5 G/DL
IMM GRANULOCYTES NFR BLD AUTO: 0 %
KETONES URINE: NEGATIVE
LDLC SERPL CALC-MCNC: 56 MG/DL
LEUKOCYTE ESTERASE URINE: NEGATIVE
LYMPHOCYTES # BLD AUTO: 0.98 K/UL
LYMPHOCYTES NFR BLD AUTO: 26.4 %
MAN DIFF?: NORMAL
MCHC RBC-ENTMCNC: 32.4 PG
MCHC RBC-ENTMCNC: 33.1 GM/DL
MCV RBC AUTO: 97.9 FL
MONOCYTES # BLD AUTO: 0.34 K/UL
MONOCYTES NFR BLD AUTO: 9.2 %
NEUTROPHILS # BLD AUTO: 2.24 K/UL
NEUTROPHILS NFR BLD AUTO: 60.4 %
NITRITE URINE: NEGATIVE
NONHDLC SERPL-MCNC: 69 MG/DL
PH URINE: 7.5
PLATELET # BLD AUTO: 160 K/UL
POTASSIUM SERPL-SCNC: 5 MMOL/L
PROT SERPL-MCNC: 6.8 G/DL
PROTEIN URINE: NORMAL
RBC # BLD: 3.86 M/UL
RBC # FLD: 12.7 %
SODIUM SERPL-SCNC: 142 MMOL/L
SPECIFIC GRAVITY URINE: 1.02
TRIGL SERPL-MCNC: 65 MG/DL
TSH SERPL-ACNC: 0.72 UIU/ML
UROBILINOGEN URINE: NORMAL
VIT B12 SERPL-MCNC: 1087 PG/ML
WBC # FLD AUTO: 3.71 K/UL

## 2023-08-10 ENCOUNTER — APPOINTMENT (OUTPATIENT)
Dept: CARDIOLOGY | Facility: CLINIC | Age: 78
End: 2023-08-10
Payer: MEDICAID

## 2023-08-10 ENCOUNTER — NON-APPOINTMENT (OUTPATIENT)
Age: 78
End: 2023-08-10

## 2023-08-10 VITALS
HEART RATE: 63 BPM | DIASTOLIC BLOOD PRESSURE: 74 MMHG | TEMPERATURE: 97.7 F | BODY MASS INDEX: 15.11 KG/M2 | RESPIRATION RATE: 18 BRPM | WEIGHT: 71 LBS | OXYGEN SATURATION: 100 % | SYSTOLIC BLOOD PRESSURE: 198 MMHG

## 2023-08-10 VITALS — DIASTOLIC BLOOD PRESSURE: 82 MMHG | SYSTOLIC BLOOD PRESSURE: 180 MMHG

## 2023-08-10 PROCEDURE — 99214 OFFICE O/P EST MOD 30 MIN: CPT

## 2023-08-10 PROCEDURE — 93000 ELECTROCARDIOGRAM COMPLETE: CPT

## 2023-08-30 NOTE — HISTORY OF PRESENT ILLNESS
[FreeTextEntry1] : 8/10/23 - Patient is here for follow-up. Patient denies CP, SOB, or palpitations. She reports R sided headache. Her BP is elevated in the office. She is on Amlodipine 5 mg, ASA 81 mg, and Simvastatin 20 mg. She is compliant to her medications. BP is 175-170-170. I advised patient to start on Losartan 50 mg   2/23/23 - Patient reports back pain. Patient denies CP, SOB, palpitations, or lightheadedness. I advised patient to get blood test.   6/24/22 - Patient reports stomach fullness and poor appetite.  Patient reports weight loss.  Patient has h/o bile gastritis by EGD 2019.  Repeat EGD in 2 years recommended.  I advised patient to see GI.  I advised patient to supplement with Ensure.  Patient denies CP, SOB, or palpitations.  Patient denies dizziness.  I will check BT.  I advised patient to continue current medications.   10/7/21 - Patient reports poor appetite and epigastric discomfort at night. Her BP is elevated today. Patient reports that she does not check BP at home and did not take her medications today. Patient had stopped Alendronate for 1/2 year due to tooth extraction and resumed due to bilateral leg pain.  Patient denies CP. Patient denies palpitations. ECG was done for hypertension.   12/14/20 phone visit -   9/25/20 - Patient reports that she has ankle pain and is unable to walk. Patient was taking pain killers and also has stomach pain and gum inflammation recently and is unable to eat. She was supposed to undergo tooth extraction but was unable to proceed reason unclear. Patient is advised to mobilize more. I advised patient to switch to Celebrex which is better for her stomach and should resume Alendronate. Patient had lab work and flu shot today.   10/31/19 - Patient denies CP, SOB, palpitations.  She reports having stomach discomfort that often feels like hunger associated with diaphoresis. Patient reports dizziness when she wakes up. She feels better later in the day. Patient had flu shot and she reports no more migraines. She still has symptoms of bile gastritis and needs a refill if Carafate.

## 2023-08-30 NOTE — REASON FOR VISIT
[FreeTextEntry1] : 77 year-old female with vertebral artery stenosis, osteoporosis, HTN and HLD, presents for followup.    Patient was last seen on 2/23/23 - Patient reports back pain. Patient denies CP, SOB, palpitations, or lightheadedness. I advised patient to get blood test.   She is on ASA 81 mg and Simvastatin 20 mg for vertebral artery stenosis.  She is on Amlodipine 5 mg for HTN.

## 2023-12-28 ENCOUNTER — APPOINTMENT (OUTPATIENT)
Dept: CARDIOLOGY | Facility: CLINIC | Age: 78
End: 2023-12-28
Payer: MEDICAID

## 2023-12-28 VITALS
RESPIRATION RATE: 17 BRPM | OXYGEN SATURATION: 100 % | SYSTOLIC BLOOD PRESSURE: 171 MMHG | WEIGHT: 74 LBS | HEART RATE: 63 BPM | BODY MASS INDEX: 15.75 KG/M2 | DIASTOLIC BLOOD PRESSURE: 78 MMHG | TEMPERATURE: 97.6 F

## 2023-12-28 DIAGNOSIS — E78.5 HYPERLIPIDEMIA, UNSPECIFIED: ICD-10-CM

## 2023-12-28 PROCEDURE — 99213 OFFICE O/P EST LOW 20 MIN: CPT

## 2023-12-29 PROBLEM — E78.5 HLD (HYPERLIPIDEMIA): Status: ACTIVE | Noted: 2023-08-09

## 2023-12-29 NOTE — REASON FOR VISIT
[FreeTextEntry1] : 77 year-old female with vertebral artery stenosis, osteoporosis, HTN and HLD, presents for followup.    Patient was last seen on 8/10/23 - Patient is here for follow-up. Patient denies CP, SOB, or palpitations. She reports R sided headache. Her BP is elevated in the office. She is on Amlodipine 5 mg, ASA 81 mg, and Simvastatin 20 mg. She is compliant to her medications. BP is 175-170-170. I advised patient to start on Losartan 50 mg   She is on ASA 81 mg and Simvastatin 20 mg for vertebral artery stenosis.  She is on Losartan 50 mg, Amlodipine 5 mg for HTN.

## 2024-01-02 LAB
25(OH)D3 SERPL-MCNC: 36.6 NG/ML
ALBUMIN SERPL ELPH-MCNC: 4.4 G/DL
ALP BLD-CCNC: 52 U/L
ALT SERPL-CCNC: 10 U/L
ANION GAP SERPL CALC-SCNC: 17 MMOL/L
AST SERPL-CCNC: 26 U/L
BILIRUB SERPL-MCNC: 0.3 MG/DL
BUN SERPL-MCNC: 12 MG/DL
CALCIUM SERPL-MCNC: 9.4 MG/DL
CHLORIDE SERPL-SCNC: 105 MMOL/L
CHOLEST SERPL-MCNC: 165 MG/DL
CO2 SERPL-SCNC: 20 MMOL/L
CREAT SERPL-MCNC: 0.54 MG/DL
EGFR: 93 ML/MIN/1.73M2
GLUCOSE SERPL-MCNC: 95 MG/DL
HDLC SERPL-MCNC: 66 MG/DL
LDLC SERPL CALC-MCNC: 80 MG/DL
NONHDLC SERPL-MCNC: 99 MG/DL
POTASSIUM SERPL-SCNC: 5.2 MMOL/L
PROT SERPL-MCNC: 6.6 G/DL
SODIUM SERPL-SCNC: 141 MMOL/L
TRIGL SERPL-MCNC: 101 MG/DL
TSH SERPL-ACNC: 1.29 UIU/ML

## 2024-04-02 ENCOUNTER — RX RENEWAL (OUTPATIENT)
Age: 79
End: 2024-04-02

## 2024-05-01 ENCOUNTER — RX RENEWAL (OUTPATIENT)
Age: 79
End: 2024-05-01

## 2024-05-06 PROBLEM — I65.09 VERTEBRAL ARTERY STENOSIS: Status: ACTIVE | Noted: 2019-10-31

## 2024-05-07 ENCOUNTER — APPOINTMENT (OUTPATIENT)
Dept: CARDIOLOGY | Facility: CLINIC | Age: 79
End: 2024-05-07
Payer: MEDICAID

## 2024-05-07 ENCOUNTER — NON-APPOINTMENT (OUTPATIENT)
Age: 79
End: 2024-05-07

## 2024-05-07 VITALS
OXYGEN SATURATION: 99 % | BODY MASS INDEX: 15.32 KG/M2 | RESPIRATION RATE: 18 BRPM | WEIGHT: 72 LBS | DIASTOLIC BLOOD PRESSURE: 67 MMHG | SYSTOLIC BLOOD PRESSURE: 130 MMHG | HEART RATE: 64 BPM

## 2024-05-07 DIAGNOSIS — I10 ESSENTIAL (PRIMARY) HYPERTENSION: ICD-10-CM

## 2024-05-07 DIAGNOSIS — I65.09 OCCLUSION AND STENOSIS OF UNSPECIFIED VERTEBRAL ARTERY: ICD-10-CM

## 2024-05-07 PROCEDURE — 99214 OFFICE O/P EST MOD 30 MIN: CPT

## 2024-05-07 PROCEDURE — 93000 ELECTROCARDIOGRAM COMPLETE: CPT

## 2024-05-08 NOTE — HISTORY OF PRESENT ILLNESS
[FreeTextEntry1] : 5/7/24 - Patient needs cardiac clearance prior to cataract surgery.  Patient reports no cardiac history.  Patient denies exertional symptoms.  /67 HR 64.  Exam unremarkable.  ECG showed no ischemic changes.  Patient is cleared for surgery and anesthesia.  She may hold ASA 81 mg for 3 days prior to surgery.  (1) Cardiac clearance prior to cataract surgery - Patient reports no cardiac history.  Patient denies exertional symptoms.  ECG showed no ischemic changes.  Patient is cleared for surgery and anesthesia.  She may hold ASA 81 mg for 3 days prior to surgery.  (2) Vertebral artery stenosis -  Patient has been stable.  Patient reports positional dizziness.  I advised patient to continue ASA 81 mg and Simvastatin 20 mg.  (3) HTN - Her BP was good.  I advised patient to continue Losartan 50 mg and Amlodipine 5 mg.  (4) Followup - 6 months.   12/28/23 - Patient has been stable.  /78 HR 63.  Repeat /70.  Patient reports home /70.  Patient reports that she forgets to take her medications at times.  I advised patient to continue current medications.  I advised patient to be compliant.  FU 6 months.   8/10/23 - Patient is here for follow-up. Patient denies CP, SOB, or palpitations. She reports R sided headache. Her BP is elevated in the office. She is on Amlodipine 5 mg, ASA 81 mg, and Simvastatin 20 mg. She is compliant to her medications. BP is 175-170-170. I advised patient to start on Losartan 50 mg   2/23/23 - Patient reports back pain. Patient denies CP, SOB, palpitations, or lightheadedness. I advised patient to get blood test.   6/24/22 - Patient reports stomach fullness and poor appetite.  Patient reports weight loss.  Patient has h/o bile gastritis by EGD 2019.  Repeat EGD in 2 years recommended.  I advised patient to see GI.  I advised patient to supplement with Ensure.  Patient denies CP, SOB, or palpitations.  Patient denies dizziness.  I will check BT.  I advised patient to continue current medications.   10/7/21 - Patient reports poor appetite and epigastric discomfort at night. Her BP is elevated today. Patient reports that she does not check BP at home and did not take her medications today. Patient had stopped Alendronate for 1/2 year due to tooth extraction and resumed due to bilateral leg pain.  Patient denies CP. Patient denies palpitations. ECG was done for hypertension.   12/14/20 phone visit -   9/25/20 - Patient reports that she has ankle pain and is unable to walk. Patient was taking pain killers and also has stomach pain and gum inflammation recently and is unable to eat. She was supposed to undergo tooth extraction but was unable to proceed reason unclear. Patient is advised to mobilize more. I advised patient to switch to Celebrex which is better for her stomach and should resume Alendronate. Patient had lab work and flu shot today.   10/31/19 - Patient denies CP, SOB, palpitations.  She reports having stomach discomfort that often feels like hunger associated with diaphoresis. Patient reports dizziness when she wakes up. She feels better later in the day. Patient had flu shot and she reports no more migraines. She still has symptoms of bile gastritis and needs a refill if Carafate.

## 2024-06-06 RX ORDER — ASPIRIN 81 MG/1
81 TABLET, COATED ORAL
Qty: 90 | Refills: 3 | Status: ACTIVE | COMMUNITY
Start: 2020-02-04 | End: 1900-01-01

## 2024-07-26 ENCOUNTER — APPOINTMENT (OUTPATIENT)
Dept: CARDIOLOGY | Facility: CLINIC | Age: 79
End: 2024-07-26
Payer: MEDICARE

## 2024-07-26 DIAGNOSIS — I10 ESSENTIAL (PRIMARY) HYPERTENSION: ICD-10-CM

## 2024-07-26 PROCEDURE — 99441: CPT | Mod: 93

## 2024-07-29 NOTE — HISTORY OF PRESENT ILLNESS
[FreeTextEntry1] : 7/24/24 telephone visit - Pt recent had Lyme disease.  Biten by tick.  Developed Rash and low grade fever.  On abx.    She is on ASA 81 mg and Simvastatin 20 mg for vertebral artery stenosis.  She is on Losartan 50 mg, Amlodipine 5 mg for HTN.  /66 at home.  I advised patient to complete abx for Lyme disease.  5/7/24 - Patient needs cardiac clearance prior to cataract surgery.  Patient reports no cardiac history.  Patient denies exertional symptoms.  /67 HR 64.  Exam unremarkable.  ECG showed no ischemic changes.  Patient is cleared for surgery and anesthesia.  She may hold ASA 81 mg for 3 days prior to surgery.  (1) Cardiac clearance prior to cataract surgery - Patient reports no cardiac history.  Patient denies exertional symptoms.  ECG showed no ischemic changes.  Patient is cleared for surgery and anesthesia.  She may hold ASA 81 mg for 3 days prior to surgery.  (2) Vertebral artery stenosis -  Patient has been stable.  Patient reports positional dizziness.  I advised patient to continue ASA 81 mg and Simvastatin 20 mg.  (3) HTN - Her BP was good.  I advised patient to continue Losartan 50 mg and Amlodipine 5 mg.  (4) Followup - 6 months.   12/28/23 - Patient has been stable.  /78 HR 63.  Repeat /70.  Patient reports home /70.  Patient reports that she forgets to take her medications at times.  I advised patient to continue current medications.  I advised patient to be compliant.  FU 6 months.   8/10/23 - Patient is here for follow-up. Patient denies CP, SOB, or palpitations. She reports R sided headache. Her BP is elevated in the office. She is on Amlodipine 5 mg, ASA 81 mg, and Simvastatin 20 mg. She is compliant to her medications. BP is 175-170-170. I advised patient to start on Losartan 50 mg   2/23/23 - Patient reports back pain. Patient denies CP, SOB, palpitations, or lightheadedness. I advised patient to get blood test.   6/24/22 - Patient reports stomach fullness and poor appetite.  Patient reports weight loss.  Patient has h/o bile gastritis by EGD 2019.  Repeat EGD in 2 years recommended.  I advised patient to see GI.  I advised patient to supplement with Ensure.  Patient denies CP, SOB, or palpitations.  Patient denies dizziness.  I will check BT.  I advised patient to continue current medications.   10/7/21 - Patient reports poor appetite and epigastric discomfort at night. Her BP is elevated today. Patient reports that she does not check BP at home and did not take her medications today. Patient had stopped Alendronate for 1/2 year due to tooth extraction and resumed due to bilateral leg pain.  Patient denies CP. Patient denies palpitations. ECG was done for hypertension.   12/14/20 phone visit -   9/25/20 - Patient reports that she has ankle pain and is unable to walk. Patient was taking pain killers and also has stomach pain and gum inflammation recently and is unable to eat. She was supposed to undergo tooth extraction but was unable to proceed reason unclear. Patient is advised to mobilize more. I advised patient to switch to Celebrex which is better for her stomach and should resume Alendronate. Patient had lab work and flu shot today.   10/31/19 - Patient denies CP, SOB, palpitations.  She reports having stomach discomfort that often feels like hunger associated with diaphoresis. Patient reports dizziness when she wakes up. She feels better later in the day. Patient had flu shot and she reports no more migraines. She still has symptoms of bile gastritis and needs a refill if Carafate.

## 2024-07-29 NOTE — REASON FOR VISIT
[FreeTextEntry1] : 79 year-old female with vertebral artery stenosis, osteoporosis, HTN and HLD, presents for followup.    Patient was last seen on 5/7/24 - Patient needs cardiac clearance prior to cataract surgery. Patient reports no cardiac history. Patient denies exertional symptoms. /67 HR 64. Exam unremarkable. ECG showed no ischemic changes. Patient is cleared for surgery and anesthesia. She may hold ASA 81 mg for 3 days prior to surgery.  She is on ASA 81 mg and Simvastatin 20 mg for vertebral artery stenosis.  She is on Losartan 50 mg, Amlodipine 5 mg for HTN.

## 2024-10-04 ENCOUNTER — MED ADMIN CHARGE (OUTPATIENT)
Age: 79
End: 2024-10-04

## 2024-10-04 ENCOUNTER — APPOINTMENT (OUTPATIENT)
Dept: CARDIOLOGY | Facility: CLINIC | Age: 79
End: 2024-10-04
Payer: MEDICAID

## 2024-10-04 VITALS
WEIGHT: 77 LBS | HEART RATE: 60 BPM | DIASTOLIC BLOOD PRESSURE: 70 MMHG | OXYGEN SATURATION: 100 % | RESPIRATION RATE: 16 BRPM | BODY MASS INDEX: 16.39 KG/M2 | SYSTOLIC BLOOD PRESSURE: 150 MMHG

## 2024-10-04 DIAGNOSIS — I10 ESSENTIAL (PRIMARY) HYPERTENSION: ICD-10-CM

## 2024-10-04 DIAGNOSIS — I65.09 OCCLUSION AND STENOSIS OF UNSPECIFIED VERTEBRAL ARTERY: ICD-10-CM

## 2024-10-04 DIAGNOSIS — E78.5 HYPERLIPIDEMIA, UNSPECIFIED: ICD-10-CM

## 2024-10-04 DIAGNOSIS — M81.0 AGE-RELATED OSTEOPOROSIS W/OUT CURRENT PATHOLOGICAL FRACTURE: ICD-10-CM

## 2024-10-04 DIAGNOSIS — E55.9 VITAMIN D DEFICIENCY, UNSPECIFIED: ICD-10-CM

## 2024-10-04 PROCEDURE — G0008: CPT

## 2024-10-04 PROCEDURE — 99214 OFFICE O/P EST MOD 30 MIN: CPT

## 2024-10-04 PROCEDURE — 90662 IIV NO PRSV INCREASED AG IM: CPT

## 2024-10-07 NOTE — REASON FOR VISIT
[FreeTextEntry1] : 79 year-old female with vertebral artery stenosis, osteoporosis, HTN and HLD, presents for followup.    Patient was last seen on 7/24/24 telephone visit - Pt recent had Lyme disease.  Biten by tick.  Developed Rash and low grade fever.  On abx.    She is on ASA 81 mg and Simvastatin 20 mg for vertebral artery stenosis.  She is on Losartan 50 mg, Amlodipine 5 mg for HTN.  /66 at home.  I advised patient to complete abx for Lyme disease.  She is on ASA 81 mg and Simvastatin 20 mg for vertebral artery stenosis.  She is on Losartan 50 mg, Amlodipine 5 mg for HTN.

## 2024-10-07 NOTE — HISTORY OF PRESENT ILLNESS
[FreeTextEntry1] : 10/4/24 - Patient denies CP, SOB, palpitations, or lightheadedness. She reports home BP of 140. She is on ASA 81 mg and Simvastatin 20 mg for vertebral artery stenosis.  She is on Losartan 50 mg, Amlodipine 5 mg for HTN.  7/24/24 telephone visit - Pt recent had Lyme disease.  Biten by tick.  Developed Rash and low grade fever.  On abx.    She is on ASA 81 mg and Simvastatin 20 mg for vertebral artery stenosis.  She is on Losartan 50 mg, Amlodipine 5 mg for HTN.  /66 at home.  I advised patient to complete abx for Lyme disease.  5/7/24 - Patient needs cardiac clearance prior to cataract surgery.  Patient reports no cardiac history.  Patient denies exertional symptoms.  /67 HR 64.  Exam unremarkable.  ECG showed no ischemic changes.  Patient is cleared for surgery and anesthesia.  She may hold ASA 81 mg for 3 days prior to surgery.  (1) Cardiac clearance prior to cataract surgery - Patient reports no cardiac history.  Patient denies exertional symptoms.  ECG showed no ischemic changes.  Patient is cleared for surgery and anesthesia.  She may hold ASA 81 mg for 3 days prior to surgery.  (2) Vertebral artery stenosis -  Patient has been stable.  Patient reports positional dizziness.  I advised patient to continue ASA 81 mg and Simvastatin 20 mg.  (3) HTN - Her BP was good.  I advised patient to continue Losartan 50 mg and Amlodipine 5 mg.  (4) Followup - 6 months.   12/28/23 - Patient has been stable.  /78 HR 63.  Repeat /70.  Patient reports home /70.  Patient reports that she forgets to take her medications at times.  I advised patient to continue current medications.  I advised patient to be compliant.  FU 6 months.   8/10/23 - Patient is here for follow-up. Patient denies CP, SOB, or palpitations. She reports R sided headache. Her BP is elevated in the office. She is on Amlodipine 5 mg, ASA 81 mg, and Simvastatin 20 mg. She is compliant to her medications. BP is 175-170-170. I advised patient to start on Losartan 50 mg   2/23/23 - Patient reports back pain. Patient denies CP, SOB, palpitations, or lightheadedness. I advised patient to get blood test.   6/24/22 - Patient reports stomach fullness and poor appetite.  Patient reports weight loss.  Patient has h/o bile gastritis by EGD 2019.  Repeat EGD in 2 years recommended.  I advised patient to see GI.  I advised patient to supplement with Ensure.  Patient denies CP, SOB, or palpitations.  Patient denies dizziness.  I will check BT.  I advised patient to continue current medications.   10/7/21 - Patient reports poor appetite and epigastric discomfort at night. Her BP is elevated today. Patient reports that she does not check BP at home and did not take her medications today. Patient had stopped Alendronate for 1/2 year due to tooth extraction and resumed due to bilateral leg pain.  Patient denies CP. Patient denies palpitations. ECG was done for hypertension.   12/14/20 phone visit -   9/25/20 - Patient reports that she has ankle pain and is unable to walk. Patient was taking pain killers and also has stomach pain and gum inflammation recently and is unable to eat. She was supposed to undergo tooth extraction but was unable to proceed reason unclear. Patient is advised to mobilize more. I advised patient to switch to Celebrex which is better for her stomach and should resume Alendronate. Patient had lab work and flu shot today.   10/31/19 - Patient denies CP, SOB, palpitations.  She reports having stomach discomfort that often feels like hunger associated with diaphoresis. Patient reports dizziness when she wakes up. She feels better later in the day. Patient had flu shot and she reports no more migraines. She still has symptoms of bile gastritis and needs a refill if Carafate.

## 2024-10-07 NOTE — PHYSICAL EXAM
[General Appearance - Well Developed] : well developed [Normal Appearance] : normal appearance [General Appearance - Well Nourished] : well nourished [Well Groomed] : well groomed [No Deformities] : no deformities [General Appearance - In No Acute Distress] : no acute distress [Normal Conjunctiva] : the conjunctiva exhibited no abnormalities [Eyelids - No Xanthelasma] : the eyelids demonstrated no xanthelasmas [Normal Oral Mucosa] : normal oral mucosa [No Oral Pallor] : no oral pallor [No Oral Cyanosis] : no oral cyanosis [Normal Jugular Venous A Waves Present] : normal jugular venous A waves present [No Jugular Venous Marte A Waves] : no jugular venous marte A waves [Normal Jugular Venous V Waves Present] : normal jugular venous V waves present [Respiration, Rhythm And Depth] : normal respiratory rhythm and effort [Auscultation Breath Sounds / Voice Sounds] : lungs were clear to auscultation bilaterally [Exaggerated Use Of Accessory Muscles For Inspiration] : no accessory muscle use [Heart Rate And Rhythm] : heart rate and rhythm were normal [Heart Sounds] : normal S1 and S2 [Murmurs] : no murmurs present [Arterial Pulses Normal] : the arterial pulses were normal [Edema] : no peripheral edema present [Abdomen Soft] : soft [Abdomen Tenderness] : non-tender [Abdomen Mass (___ Cm)] : no abdominal mass palpated [Abnormal Walk] : normal gait [Gait - Sufficient For Exercise Testing] : the gait was sufficient for exercise testing [Nail Clubbing] : no clubbing of the fingernails [Cyanosis, Localized] : no localized cyanosis [Petechial Hemorrhages (___cm)] : no petechial hemorrhages [] : no ischemic changes [Oriented To Time, Place, And Person] : oriented to person, place, and time [Affect] : the affect was normal [No Anxiety] : not feeling anxious [Mood] : the mood was normal [FreeTextEntry1] : Left carotid bruit noted.

## 2024-10-07 NOTE — PHYSICAL EXAM
[General Appearance - Well Developed] : well developed [Normal Appearance] : normal appearance [General Appearance - Well Nourished] : well nourished [Well Groomed] : well groomed [No Deformities] : no deformities [General Appearance - In No Acute Distress] : no acute distress [Normal Conjunctiva] : the conjunctiva exhibited no abnormalities [Eyelids - No Xanthelasma] : the eyelids demonstrated no xanthelasmas [Normal Oral Mucosa] : normal oral mucosa [No Oral Pallor] : no oral pallor [No Oral Cyanosis] : no oral cyanosis [Normal Jugular Venous A Waves Present] : normal jugular venous A waves present [No Jugular Venous Marte A Waves] : no jugular venous marte A waves [Normal Jugular Venous V Waves Present] : normal jugular venous V waves present [Respiration, Rhythm And Depth] : normal respiratory rhythm and effort [Auscultation Breath Sounds / Voice Sounds] : lungs were clear to auscultation bilaterally [Exaggerated Use Of Accessory Muscles For Inspiration] : no accessory muscle use [Heart Rate And Rhythm] : heart rate and rhythm were normal [Heart Sounds] : normal S1 and S2 [Murmurs] : no murmurs present [Arterial Pulses Normal] : the arterial pulses were normal [Edema] : no peripheral edema present [Abdomen Soft] : soft [Abdomen Tenderness] : non-tender [Abdomen Mass (___ Cm)] : no abdominal mass palpated [Abnormal Walk] : normal gait [Gait - Sufficient For Exercise Testing] : the gait was sufficient for exercise testing [Nail Clubbing] : no clubbing of the fingernails [Cyanosis, Localized] : no localized cyanosis [] : no ischemic changes [Petechial Hemorrhages (___cm)] : no petechial hemorrhages [Oriented To Time, Place, And Person] : oriented to person, place, and time [Affect] : the affect was normal [No Anxiety] : not feeling anxious [Mood] : the mood was normal [FreeTextEntry1] : Left carotid bruit noted.

## 2024-10-07 NOTE — PHYSICAL EXAM
[General Appearance - Well Developed] : well developed [Normal Appearance] : normal appearance [Well Groomed] : well groomed [General Appearance - Well Nourished] : well nourished [No Deformities] : no deformities [General Appearance - In No Acute Distress] : no acute distress [Normal Conjunctiva] : the conjunctiva exhibited no abnormalities [Eyelids - No Xanthelasma] : the eyelids demonstrated no xanthelasmas [Normal Oral Mucosa] : normal oral mucosa [No Oral Pallor] : no oral pallor [No Oral Cyanosis] : no oral cyanosis [Normal Jugular Venous A Waves Present] : normal jugular venous A waves present [No Jugular Venous Marte A Waves] : no jugular venous marte A waves [Normal Jugular Venous V Waves Present] : normal jugular venous V waves present [Respiration, Rhythm And Depth] : normal respiratory rhythm and effort [Exaggerated Use Of Accessory Muscles For Inspiration] : no accessory muscle use [Auscultation Breath Sounds / Voice Sounds] : lungs were clear to auscultation bilaterally [Heart Rate And Rhythm] : heart rate and rhythm were normal [Heart Sounds] : normal S1 and S2 [Murmurs] : no murmurs present [Arterial Pulses Normal] : the arterial pulses were normal [Edema] : no peripheral edema present [Abdomen Soft] : soft [Abdomen Tenderness] : non-tender [Abdomen Mass (___ Cm)] : no abdominal mass palpated [Abnormal Walk] : normal gait [Gait - Sufficient For Exercise Testing] : the gait was sufficient for exercise testing [Nail Clubbing] : no clubbing of the fingernails [Cyanosis, Localized] : no localized cyanosis [Petechial Hemorrhages (___cm)] : no petechial hemorrhages [] : no ischemic changes [Oriented To Time, Place, And Person] : oriented to person, place, and time [Affect] : the affect was normal [Mood] : the mood was normal [No Anxiety] : not feeling anxious [FreeTextEntry1] : Left carotid bruit noted.

## 2024-10-08 LAB
25(OH)D3 SERPL-MCNC: 48.2 NG/ML
ALBUMIN SERPL ELPH-MCNC: 4.5 G/DL
ALP BLD-CCNC: 52 U/L
ALT SERPL-CCNC: 10 U/L
ANION GAP SERPL CALC-SCNC: 11 MMOL/L
AST SERPL-CCNC: 21 U/L
BILIRUB SERPL-MCNC: 0.9 MG/DL
BUN SERPL-MCNC: 13 MG/DL
CALCIUM SERPL-MCNC: 9.1 MG/DL
CHLORIDE SERPL-SCNC: 107 MMOL/L
CHOLEST SERPL-MCNC: 154 MG/DL
CO2 SERPL-SCNC: 25 MMOL/L
CREAT SERPL-MCNC: 0.54 MG/DL
EGFR: 92 ML/MIN/1.73M2
ESTIMATED AVERAGE GLUCOSE: 114 MG/DL
GLUCOSE SERPL-MCNC: 91 MG/DL
HBA1C MFR BLD HPLC: 5.6 %
HCT VFR BLD CALC: 35.9 %
HDLC SERPL-MCNC: 76 MG/DL
HGB BLD-MCNC: 12.1 G/DL
LDLC SERPL CALC-MCNC: 66 MG/DL
MCHC RBC-ENTMCNC: 33.3 PG
MCHC RBC-ENTMCNC: 33.7 GM/DL
MCV RBC AUTO: 98.9 FL
NONHDLC SERPL-MCNC: 78 MG/DL
PLATELET # BLD AUTO: 160 K/UL
POTASSIUM SERPL-SCNC: 4.7 MMOL/L
PROT SERPL-MCNC: 6.5 G/DL
RBC # BLD: 3.63 M/UL
RBC # FLD: 13.1 %
SODIUM SERPL-SCNC: 142 MMOL/L
TRIGL SERPL-MCNC: 58 MG/DL
TSH SERPL-ACNC: 0.93 UIU/ML
WBC # FLD AUTO: 3.57 K/UL